# Patient Record
Sex: FEMALE | Employment: OTHER | URBAN - METROPOLITAN AREA
[De-identification: names, ages, dates, MRNs, and addresses within clinical notes are randomized per-mention and may not be internally consistent; named-entity substitution may affect disease eponyms.]

---

## 2023-04-03 DIAGNOSIS — Z00.00 HEALTH CARE MAINTENANCE: Primary | ICD-10-CM

## 2023-04-04 RX ORDER — MULTIVITAMIN WITH FOLIC ACID 400 MCG
TABLET ORAL
Qty: 30 EACH | Refills: 11 | Status: SHIPPED | OUTPATIENT
Start: 2023-04-04 | End: 2023-05-16

## 2023-05-02 DIAGNOSIS — F25.9 SCHIZOAFFECTIVE DISORDER, UNSPECIFIED TYPE (MULTI): Primary | ICD-10-CM

## 2023-05-04 RX ORDER — RISPERIDONE 1 MG/1
TABLET ORAL
Qty: 62 TABLET | Refills: 0 | Status: SHIPPED | OUTPATIENT
Start: 2023-05-04 | End: 2023-05-16

## 2023-05-15 ENCOUNTER — OFFICE VISIT (OUTPATIENT)
Dept: PRIMARY CARE | Facility: CLINIC | Age: 43
End: 2023-05-15
Payer: MEDICAID

## 2023-05-15 VITALS — HEART RATE: 77 BPM | SYSTOLIC BLOOD PRESSURE: 118 MMHG | OXYGEN SATURATION: 99 % | DIASTOLIC BLOOD PRESSURE: 72 MMHG

## 2023-05-15 DIAGNOSIS — E55.9 VITAMIN D DEFICIENCY: Primary | ICD-10-CM

## 2023-05-15 DIAGNOSIS — G40.909 SEIZURE DISORDER (MULTI): ICD-10-CM

## 2023-05-15 DIAGNOSIS — Z86.2 HISTORY OF ANEMIA: ICD-10-CM

## 2023-05-15 DIAGNOSIS — F25.9 SCHIZOAFFECTIVE DISORDER, UNSPECIFIED TYPE (MULTI): ICD-10-CM

## 2023-05-15 PROBLEM — R92.30 DENSE BREAST TISSUE ON MAMMOGRAM: Status: ACTIVE | Noted: 2023-05-15

## 2023-05-15 PROCEDURE — 1036F TOBACCO NON-USER: CPT | Performed by: SPECIALIST

## 2023-05-15 PROCEDURE — 99214 OFFICE O/P EST MOD 30 MIN: CPT | Performed by: SPECIALIST

## 2023-05-15 RX ORDER — ERGOCALCIFEROL 1.25 MG/1
1.25 CAPSULE ORAL
COMMUNITY
End: 2023-05-16

## 2023-05-15 RX ORDER — PHENOBARBITAL 64.8 MG/1
64.8 TABLET ORAL 2 TIMES DAILY
COMMUNITY
Start: 2023-03-01 | End: 2023-12-13

## 2023-05-15 RX ORDER — ACETAMINOPHEN 325 MG/1
325 TABLET ORAL EVERY 6 HOURS PRN
COMMUNITY
Start: 2019-10-04

## 2023-05-15 RX ORDER — DOCUSATE SODIUM 100 MG/1
100 CAPSULE, LIQUID FILLED ORAL DAILY
COMMUNITY
Start: 2021-07-29 | End: 2023-05-16

## 2023-05-15 RX ORDER — FERROUS SULFATE 325(65) MG
65 TABLET ORAL EVERY OTHER DAY
COMMUNITY
Start: 2020-10-14 | End: 2023-05-16

## 2023-05-15 RX ORDER — FERROUS SULFATE, DRIED 160(50) MG
1 TABLET, EXTENDED RELEASE ORAL 2 TIMES DAILY
COMMUNITY
Start: 2020-08-07 | End: 2023-05-16

## 2023-05-15 RX ORDER — ALUMINUM HYDROXIDE, MAGNESIUM HYDROXIDE, AND SIMETHICONE 1200; 120; 1200 MG/30ML; MG/30ML; MG/30ML
15 SUSPENSION ORAL EVERY 6 HOURS PRN
COMMUNITY
End: 2023-05-16

## 2023-05-15 RX ORDER — ZONISAMIDE 100 MG/1
100 CAPSULE ORAL DAILY
COMMUNITY

## 2023-05-15 RX ORDER — FLUTICASONE PROPIONATE 50 MCG
1 SPRAY, SUSPENSION (ML) NASAL
COMMUNITY
End: 2023-05-16

## 2023-05-15 RX ORDER — CODEINE PHOSPHATE AND GUAIFENESIN 10; 100 MG/5ML; MG/5ML
10 SOLUTION ORAL EVERY 6 HOURS PRN
COMMUNITY
End: 2023-05-18 | Stop reason: ALTCHOICE

## 2023-05-15 RX ORDER — PHENYTOIN SODIUM 100 MG/1
100 CAPSULE, EXTENDED RELEASE ORAL DAILY
COMMUNITY
Start: 2017-12-01

## 2023-05-15 RX ORDER — ESCITALOPRAM OXALATE 20 MG/1
20 TABLET ORAL DAILY
COMMUNITY
End: 2023-05-16

## 2023-05-15 RX ORDER — ESCITALOPRAM OXALATE 20 MG/1
20 TABLET ORAL
COMMUNITY
Start: 2020-08-06 | End: 2023-05-15 | Stop reason: ENTERED-IN-ERROR

## 2023-05-15 NOTE — PROGRESS NOTES
Subjective   Patient ID: Cleo Lopez is a 42 y.o. female who presents for Follow-up.  HPI      41 yo female Pmhx sig for Seizure Disorder, Allergic Rhinitis, Moderate Mental Retardation presents today with caregiver for follow-up    Due for wellness exam in November    No Known Allergies   Current Outpatient Medications   Medication Instructions    acetaminophen (TYLENOL) 325 mg, oral, Every 6 hours PRN    Antacid-Antigas 200-200-20 mg/5 mL oral suspension TAKE 30 ML BY MOUTH EVERY 4 HOURS AS NEEDED FOR INDIGESTION (MAY USE UP TO 3 TIMES PER WEEK)    codeine-guaifenesin (Robitussin-AC)  mg/5 mL syrup 10 mL, oral, Every 6 hours PRN    docusate sodium (Colace) 100 mg capsule TAKE 1 CAPSULE BY MOUTH ONCE EVERY DAY FOR CONSTIPATION (STOOL SOFTENER) (8PM)    escitalopram (Lexapro) 20 mg tablet TAKE 1 TABLET BY MOUTH ONCE EVERY DAY FOR DEPRESSION    FeroSuL 325 mg (65 mg iron) tablet TAKE 1 TABLET BY MOUTH TWICE WEEKLY ON WEDNESDAY & SATURDAY FOR SUPPLEMENT    fluticasone (Flonase) 50 mcg/actuation nasal spray USE 1 SPRAY IN EACH NOSTRIL 2 TIMES DAILY AS NEEDED FOR ALLERGY SYMPTOMS    multivitamin with folic acid (Tab-A-Shravan) 400 mcg tablet TAKE 1 TABLET BY MOUTH EVERY EVENING FOR SUPPLEMENT    Oyster Shell Calcium-Vit D3 500 mg-5 mcg (200 unit) tablet TAKE 1 TABLET BY MOUTH TWICE DAILY FOR SUPPLEMENT (7AM,8PM)    PHENobarbitaL (LUMINAL) 64.8 mg, oral, 2 times daily    phenytoin ER (DILANTIN) 60 mg, oral, Daily    phenytoin ER (DILANTIN) 100 mg, oral, Daily    risperiDONE (RisperDAL) 1 mg tablet TAKE 1 TABLET BY MOUTH TWICE DAILY FOR ANTIPSYCHOTIC (7AM,8PM)    Vitamin D2 1,250 mcg (50,000 unit) capsule TAKE 1 CAPSULE BY MOUTH EVERY OTHER WEEK (14TH & 28TH OF EACH MONTH) FOR BONE HEALTH    zonisamide (ZONEGRAN) 100 mg, oral, Daily, 3 capsules by mouth at bedtime.        Review of Systems  Constitutional  No fatigue, no fevers, no chills, no unintentional weight loss,   HEENT:  No headaches, no dizziness,    Cardiovascular:  No chest pain, no palpitations,  Respiratory:  No cough, No shortness of breath at rest  GI:  No abdominal pain, no nausea, no vomiting, no changes in bowel habits, no bright red blood per rectum, no melena  :  No urinary frequency, no dysuria, no urine incontinence  MSK:  One fall    Neuro:  Last seizure 3 months ago, no tremors, no extremity weakness,     Physical Exam  /72   Pulse 77   SpO2 99%   General:    Well-appearing  F in no acute distress, well nourished, well hydrated  Head:  Normocephalic, atraumatic  Skin:          Warm dry,   Eyes:  Anicteric sclera, pupils equal,   Oral:       Not examined due to pandemic  Neck:   Supple, no cervical/supraclavicular adenopathy, no thyromegaly or nodules appreciated on exam  Cor:      Regular rate, normal S1, S2, no murmurs appreciated, no S3, no S4   Lungs:   Clear to auscultation b/l, no wheezes, no rhonchi, no crackles, no accessory respiratory muscle use  Abd                  soft nontender    Assessment/Plan   Problem List Items Addressed This Visit          Nervous    Seizure disorder (CMS/HCC)     Management per neurology   Ordered phenytoin level         Relevant Orders    Phenytoin level, free    Comprehensive Metabolic Panel    CBC       Endocrine/Metabolic    Vitamin D deficiency - Primary     On weekly 50,000 units Vitamin D every other week, labs ordered Vit D         Relevant Orders    Vitamin D 25-Hydroxy,Total       Other    History of anemia     Iron levels ordered         Relevant Orders    Iron and TIBC    CBC          Carmelita Stiles DO

## 2023-05-16 DIAGNOSIS — Z00.00 HEALTH CARE MAINTENANCE: ICD-10-CM

## 2023-05-16 DIAGNOSIS — F25.9 SCHIZOAFFECTIVE DISORDER, UNSPECIFIED TYPE (MULTI): ICD-10-CM

## 2023-05-16 RX ORDER — DOCUSATE SODIUM 100 MG/1
CAPSULE, LIQUID FILLED ORAL
Qty: 31 CAPSULE | Refills: 5 | Status: SHIPPED | OUTPATIENT
Start: 2023-05-16 | End: 2023-10-28

## 2023-05-16 RX ORDER — LANOLIN ALCOHOL/MO/W.PET/CERES
CREAM (GRAM) TOPICAL
Qty: 62 TABLET | Refills: 5 | Status: SHIPPED | OUTPATIENT
Start: 2023-05-16 | End: 2023-10-28

## 2023-05-16 RX ORDER — ALUMINUM HYDROXIDE, MAGNESIUM HYDROXIDE, SIMETHICONE 400; 400; 40 MG/10ML; MG/10ML; MG/10ML
SUSPENSION ORAL
Qty: 355 ML | Refills: 5 | Status: SHIPPED | OUTPATIENT
Start: 2023-05-16

## 2023-05-16 RX ORDER — MULTIVITAMIN WITH FOLIC ACID 400 MCG
TABLET ORAL
Qty: 30 EACH | Refills: 11 | Status: SHIPPED | OUTPATIENT
Start: 2023-05-16 | End: 2024-05-08

## 2023-05-16 RX ORDER — FERROUS SULFATE TAB 325 MG (65 MG ELEMENTAL FE) 325 (65 FE) MG
TAB ORAL
Qty: 10 TABLET | Refills: 5 | Status: SHIPPED | OUTPATIENT
Start: 2023-05-16 | End: 2023-12-22 | Stop reason: SDUPTHER

## 2023-05-16 RX ORDER — ERGOCALCIFEROL 1.25 1/1
CAPSULE ORAL
Qty: 2 CAPSULE | Refills: 5 | Status: SHIPPED | OUTPATIENT
Start: 2023-05-16 | End: 2023-11-27

## 2023-05-16 RX ORDER — RISPERIDONE 1 MG/1
TABLET ORAL
Qty: 62 TABLET | Refills: 5 | Status: SHIPPED | OUTPATIENT
Start: 2023-05-16 | End: 2023-10-28

## 2023-05-16 RX ORDER — ESCITALOPRAM OXALATE 20 MG/1
TABLET ORAL
Qty: 31 TABLET | Refills: 5 | Status: SHIPPED | OUTPATIENT
Start: 2023-05-16 | End: 2023-10-28

## 2023-05-16 RX ORDER — FLUTICASONE PROPIONATE 50 MCG
SPRAY, SUSPENSION (ML) NASAL
Qty: 16 G | Refills: 5 | Status: SHIPPED | OUTPATIENT
Start: 2023-05-16 | End: 2023-12-13 | Stop reason: WASHOUT

## 2023-05-18 NOTE — PROGRESS NOTES
Subjective   Patient ID: Cleo Lopez is a 42 y.o. female who presents for Follow-up.  HPI      41 yo female Pmhx sig for Seizure Disorder, Allergic Rhinitis, Moderate Mental Retardation presents today with caregiver for follow-up    Due for wellness exam in November    No Known Allergies   Current Outpatient Medications   Medication Instructions    acetaminophen (TYLENOL) 325 mg, oral, Every 6 hours PRN    Antacid-Antigas 200-200-20 mg/5 mL oral suspension TAKE 30 ML BY MOUTH EVERY 4 HOURS AS NEEDED FOR INDIGESTION (MAY USE UP TO 3 TIMES PER WEEK)    docusate sodium (Colace) 100 mg capsule TAKE 1 CAPSULE BY MOUTH ONCE EVERY DAY FOR CONSTIPATION (STOOL SOFTENER) (8PM)    escitalopram (Lexapro) 20 mg tablet TAKE 1 TABLET BY MOUTH ONCE EVERY DAY FOR DEPRESSION    FeroSuL 325 mg (65 mg iron) tablet TAKE 1 TABLET BY MOUTH TWICE WEEKLY ON WEDNESDAY & SATURDAY FOR SUPPLEMENT    fluticasone (Flonase) 50 mcg/actuation nasal spray USE 1 SPRAY IN EACH NOSTRIL 2 TIMES DAILY AS NEEDED FOR ALLERGY SYMPTOMS    multivitamin with folic acid (Tab-A-Shravan) 400 mcg tablet TAKE 1 TABLET BY MOUTH EVERY EVENING FOR SUPPLEMENT    Oyster Shell Calcium-Vit D3 500 mg-5 mcg (200 unit) tablet TAKE 1 TABLET BY MOUTH TWICE DAILY FOR SUPPLEMENT (7AM,8PM)    PHENobarbitaL (LUMINAL) 64.8 mg, oral, 2 times daily    phenytoin ER (DILANTIN) 60 mg, oral, Daily    phenytoin ER (DILANTIN) 100 mg, oral, Daily    risperiDONE (RisperDAL) 1 mg tablet TAKE 1 TABLET BY MOUTH TWICE DAILY FOR ANTIPSYCHOTIC (7AM,8PM)    Vitamin D2 1,250 mcg (50,000 unit) capsule TAKE 1 CAPSULE BY MOUTH EVERY OTHER WEEK (14TH & 28TH OF EACH MONTH) FOR BONE HEALTH    zonisamide (ZONEGRAN) 100 mg, oral, Daily, 3 capsules by mouth at bedtime.        Review of Systems  Constitutional  No fatigue, no fevers, no chills, no unintentional weight loss,   HEENT:  No headaches, no dizziness,   Cardiovascular:  No chest pain, no palpitations,  Respiratory:  No cough, No shortness of breath  at rest  GI:  No abdominal pain, no nausea, no vomiting, no changes in bowel habits, no bright red blood per rectum, no melena  :  No urinary frequency, no dysuria, no urine incontinence  MSK:  One fall    Neuro:  Last seizure 3 months ago, no tremors, no extremity weakness,     Physical Exam  /72   Pulse 77   SpO2 99%   General:    Well-appearing  F in no acute distress, well nourished, well hydrated  Head:  Normocephalic, atraumatic  Skin:          Warm dry,   Eyes:  Anicteric sclera, pupils equal,   Oral:       Not examined due to pandemic  Neck:   Supple, no cervical/supraclavicular adenopathy, no thyromegaly or nodules appreciated on exam  Cor:      Regular rate, normal S1, S2, no murmurs appreciated, no S3, no S4   Lungs:   Clear to auscultation b/l, no wheezes, no rhonchi, no crackles, no accessory respiratory muscle use  Abd                  soft nontender    Assessment/Plan   Problem List Items Addressed This Visit          Nervous    Seizure disorder (CMS/HCC)     Management per neurology   Ordered phenytoin level         Relevant Orders    Phenytoin level, free    Comprehensive Metabolic Panel    CBC       Endocrine/Metabolic    Vitamin D deficiency - Primary     On weekly 50,000 units Vitamin D every other week, labs ordered Vit D         Relevant Orders    Vitamin D 25-Hydroxy,Total       Other    Schizoaffective disorder (CMS/HCC)     Stable on current medication         History of anemia     Iron levels ordered  Taking iron supplement 2 days per week         Relevant Orders    Iron and TIBC    CBC          Carmelita Stiles DO

## 2023-10-27 DIAGNOSIS — Z00.00 HEALTH CARE MAINTENANCE: ICD-10-CM

## 2023-10-27 DIAGNOSIS — F25.9 SCHIZOAFFECTIVE DISORDER, UNSPECIFIED TYPE (MULTI): ICD-10-CM

## 2023-10-28 RX ORDER — ESCITALOPRAM OXALATE 20 MG/1
TABLET ORAL
Qty: 30 TABLET | Refills: 2 | Status: SHIPPED | OUTPATIENT
Start: 2023-10-28 | End: 2024-01-07

## 2023-10-28 RX ORDER — LANOLIN ALCOHOL/MO/W.PET/CERES
CREAM (GRAM) TOPICAL
Qty: 60 TABLET | Refills: 2 | Status: SHIPPED | OUTPATIENT
Start: 2023-10-28 | End: 2024-01-07

## 2023-10-28 RX ORDER — RISPERIDONE 1 MG/1
TABLET ORAL
Qty: 60 TABLET | Refills: 2 | Status: SHIPPED | OUTPATIENT
Start: 2023-10-28 | End: 2024-01-07

## 2023-10-28 RX ORDER — DOCUSATE SODIUM 100 MG/1
CAPSULE, LIQUID FILLED ORAL
Qty: 30 CAPSULE | Refills: 2 | Status: SHIPPED | OUTPATIENT
Start: 2023-10-28 | End: 2024-01-07

## 2023-11-24 DIAGNOSIS — Z00.00 HEALTH CARE MAINTENANCE: ICD-10-CM

## 2023-11-27 RX ORDER — ERGOCALCIFEROL 1.25 1/1
CAPSULE ORAL
Qty: 2 CAPSULE | Refills: 1 | Status: SHIPPED | OUTPATIENT
Start: 2023-11-27 | End: 2024-01-16

## 2023-12-13 ENCOUNTER — OFFICE VISIT (OUTPATIENT)
Dept: PRIMARY CARE | Facility: CLINIC | Age: 43
End: 2023-12-13
Payer: MEDICAID

## 2023-12-13 ENCOUNTER — LAB (OUTPATIENT)
Dept: LAB | Facility: LAB | Age: 43
End: 2023-12-13
Payer: MEDICAID

## 2023-12-13 VITALS
SYSTOLIC BLOOD PRESSURE: 119 MMHG | HEIGHT: 66 IN | WEIGHT: 197 LBS | HEART RATE: 77 BPM | RESPIRATION RATE: 16 BRPM | OXYGEN SATURATION: 98 % | BODY MASS INDEX: 31.66 KG/M2 | DIASTOLIC BLOOD PRESSURE: 77 MMHG

## 2023-12-13 DIAGNOSIS — Z00.00 MEDICARE ANNUAL WELLNESS VISIT, SUBSEQUENT: Primary | ICD-10-CM

## 2023-12-13 DIAGNOSIS — Z12.31 ENCOUNTER FOR SCREENING MAMMOGRAM FOR MALIGNANT NEOPLASM OF BREAST: ICD-10-CM

## 2023-12-13 DIAGNOSIS — G40.909 SEIZURE DISORDER (MULTI): ICD-10-CM

## 2023-12-13 DIAGNOSIS — Z23 NEED FOR IMMUNIZATION AGAINST INFLUENZA: ICD-10-CM

## 2023-12-13 DIAGNOSIS — Z12.11 SCREEN FOR COLON CANCER: ICD-10-CM

## 2023-12-13 DIAGNOSIS — E55.9 VITAMIN D DEFICIENCY: ICD-10-CM

## 2023-12-13 DIAGNOSIS — E78.5 HYPERLIPIDEMIA, UNSPECIFIED HYPERLIPIDEMIA TYPE: ICD-10-CM

## 2023-12-13 DIAGNOSIS — Z86.2 HISTORY OF ANEMIA: ICD-10-CM

## 2023-12-13 LAB
25(OH)D3 SERPL-MCNC: 31 NG/ML (ref 30–100)
ALBUMIN SERPL BCP-MCNC: 4 G/DL (ref 3.4–5)
ALP SERPL-CCNC: 145 U/L (ref 33–110)
ALT SERPL W P-5'-P-CCNC: 18 U/L (ref 7–45)
ANION GAP SERPL CALC-SCNC: 13 MMOL/L (ref 10–20)
AST SERPL W P-5'-P-CCNC: 17 U/L (ref 9–39)
BILIRUB SERPL-MCNC: 0.3 MG/DL (ref 0–1.2)
BUN SERPL-MCNC: 17 MG/DL (ref 6–23)
CALCIUM SERPL-MCNC: 8.9 MG/DL (ref 8.6–10.6)
CHLORIDE SERPL-SCNC: 108 MMOL/L (ref 98–107)
CO2 SERPL-SCNC: 24 MMOL/L (ref 21–32)
CREAT SERPL-MCNC: 0.69 MG/DL (ref 0.5–1.05)
ERYTHROCYTE [DISTWIDTH] IN BLOOD BY AUTOMATED COUNT: 13.5 % (ref 11.5–14.5)
GFR SERPL CREATININE-BSD FRML MDRD: >90 ML/MIN/1.73M*2
GLUCOSE SERPL-MCNC: 83 MG/DL (ref 74–99)
HCT VFR BLD AUTO: 42.4 % (ref 36–46)
HGB BLD-MCNC: 13.9 G/DL (ref 12–16)
IRON SATN MFR SERPL: 41 % (ref 25–45)
IRON SERPL-MCNC: 92 UG/DL (ref 35–150)
MCH RBC QN AUTO: 31.2 PG (ref 26–34)
MCHC RBC AUTO-ENTMCNC: 32.8 G/DL (ref 32–36)
MCV RBC AUTO: 95 FL (ref 80–100)
NRBC BLD-RTO: 0 /100 WBCS (ref 0–0)
PLATELET # BLD AUTO: 239 X10*3/UL (ref 150–450)
POTASSIUM SERPL-SCNC: 4.2 MMOL/L (ref 3.5–5.3)
PROT SERPL-MCNC: 7.1 G/DL (ref 6.4–8.2)
RBC # BLD AUTO: 4.46 X10*6/UL (ref 4–5.2)
SODIUM SERPL-SCNC: 141 MMOL/L (ref 136–145)
TIBC SERPL-MCNC: 225 UG/DL (ref 240–445)
UIBC SERPL-MCNC: 133 UG/DL (ref 110–370)
WBC # BLD AUTO: 7.7 X10*3/UL (ref 4.4–11.3)

## 2023-12-13 PROCEDURE — G0439 PPPS, SUBSEQ VISIT: HCPCS | Performed by: SPECIALIST

## 2023-12-13 PROCEDURE — 83550 IRON BINDING TEST: CPT

## 2023-12-13 PROCEDURE — 80186 ASSAY OF PHENYTOIN FREE: CPT

## 2023-12-13 PROCEDURE — 82306 VITAMIN D 25 HYDROXY: CPT

## 2023-12-13 PROCEDURE — 36415 COLL VENOUS BLD VENIPUNCTURE: CPT

## 2023-12-13 PROCEDURE — 83540 ASSAY OF IRON: CPT

## 2023-12-13 PROCEDURE — 85027 COMPLETE CBC AUTOMATED: CPT

## 2023-12-13 PROCEDURE — 80053 COMPREHEN METABOLIC PANEL: CPT

## 2023-12-13 PROCEDURE — 90471 IMMUNIZATION ADMIN: CPT | Performed by: SPECIALIST

## 2023-12-13 PROCEDURE — 90686 IIV4 VACC NO PRSV 0.5 ML IM: CPT | Performed by: SPECIALIST

## 2023-12-13 RX ORDER — CHLORHEXIDINE GLUCONATE ORAL RINSE 1.2 MG/ML
SOLUTION DENTAL 2 TIMES DAILY
COMMUNITY
Start: 2023-11-20

## 2023-12-13 RX ORDER — DIAZEPAM 10 MG/100UL
1 SPRAY NASAL ONCE
COMMUNITY

## 2023-12-13 ASSESSMENT — PATIENT HEALTH QUESTIONNAIRE - PHQ9
SUM OF ALL RESPONSES TO PHQ9 QUESTIONS 1 AND 2: 0
1. LITTLE INTEREST OR PLEASURE IN DOING THINGS: NOT AT ALL
1. LITTLE INTEREST OR PLEASURE IN DOING THINGS: NOT AT ALL
2. FEELING DOWN, DEPRESSED OR HOPELESS: NOT AT ALL
2. FEELING DOWN, DEPRESSED OR HOPELESS: NOT AT ALL
SUM OF ALL RESPONSES TO PHQ9 QUESTIONS 1 AND 2: 0

## 2023-12-13 ASSESSMENT — ACTIVITIES OF DAILY LIVING (ADL)
GROCERY_SHOPPING: TOTAL CARE
TAKING_MEDICATION: TOTAL CARE
MANAGING_FINANCES: TOTAL CARE
BATHING: INDEPENDENT
DOING_HOUSEWORK: TOTAL CARE
DRESSING: INDEPENDENT

## 2023-12-13 ASSESSMENT — ENCOUNTER SYMPTOMS
LOSS OF SENSATION IN FEET: 0
DEPRESSION: 0
OCCASIONAL FEELINGS OF UNSTEADINESS: 0

## 2023-12-13 NOTE — PATIENT INSTRUCTIONS
Received annual influenza vaccine today  Recommend Covid vaccine  Recommend Tdap every 10 yrs  Schedule mammogram, colonoscopy and follow-up gynecology  Get blood work done (open orders from 5/2023)

## 2023-12-13 NOTE — PROGRESS NOTES
Subjective   Patient ID: Cleo Lopez is a 43 y.o. female who presents for No chief complaint on file..  HPI    42 yo female Pmhx sig for Seizure disorder, Allergic Rhinitis and Moderate MR presents for MAW with caregiver  Info for this visit mainly from caregiver due to patient's MR    Dilantin 30 mg tab ER 4 capsules 120 nmg HS for seizures at 8 pm  100 mg HS dilantin ER and 30 4 capsules 120 HS at bedtime with 100 mg   This is refilled by neurology, unclear if she's ER dilantin or not  Lost weight was up to 204 on portion control      Sees neurologist for seizure meds takes dilantin, phenobarb    No Known Allergies   Current Outpatient Medications   Medication Instructions    acetaminophen (TYLENOL) 325 mg, oral, Every 6 hours PRN    Antacid-Antigas 200-200-20 mg/5 mL oral suspension TAKE 30 ML BY MOUTH EVERY 4 HOURS AS NEEDED FOR INDIGESTION (MAY USE UP TO 3 TIMES PER WEEK)    chlorhexidine (Peridex) 0.12 % solution Use in the mouth or throat 2 times a day. Swish and spit    diazePAM (Valtoco) 20 mg/2 spray spray,non-aerosol nasal spray 1 spray, Each Nostril, Once    docusate sodium (Colace) 100 mg capsule TAKE 1 CAPSULE BY MOUTH ONCE EVERY DAY FOR CONSTIPATION (STOOL SOFTENER) (8PM)    escitalopram (Lexapro) 20 mg tablet TAKE 1 TABLET BY MOUTH ONCE EVERY DAY FOR DEPRESSION    ferrous sulfate, 325 mg ferrous sulfate, (FeroSuL) tablet TAKE 1 TABLET BY MOUTH TWICE WEEKLY ON WEDNESDAY & SATURDAY FOR SUPPLEMENT    multivitamin with folic acid (Tab-A-Shravan) 400 mcg tablet TAKE 1 TABLET BY MOUTH EVERY EVENING FOR SUPPLEMENT    Oyster Shell Calcium-Vit D3 500 mg-5 mcg (200 unit) tablet TAKE 1 TABLET BY MOUTH TWICE DAILY FOR SUPPLEMENT (7AM,8PM)    PHENobarbitaL 64.8 mg, oral, 2 times daily    phenytoin ER (DILANTIN) 120 mg, oral, Daily    phenytoin ER (DILANTIN) 100 mg, oral, Daily    risperiDONE (RisperDAL) 1 mg tablet TAKE 1 TABLET BY MOUTH TWICE DAILY FOR ANTIPSYCHOTIC (7AM,8PM)    Vitamin D2 1,250 mcg (50,000  "unit) capsule (NOT CF) TAKE 1 CAPSULE BY MOUTH EVERY OTHER WEEK (14TH & 28TH OF EACH MONTH) FOR BONE HEALTH    zonisamide (ZONEGRAN) 100 mg, oral, Daily, 3 capsules by mouth at bedtime.        Review of Systems (Caregiver providing most information)  Constitutional  No fatigue, no fevers, no chills, no unintentional weight loss,   HEENT:  No headaches, no dizziness, no double vision, no blurred vision, no hearing loss  Cardiovascular:  No chest pain, no palpitations, no shortness of breath with exertion (one flight of stairs),   Respiratory:  No cough, no hemoptysis, no wheezing, No shortness of breath at rest  GI:  No dysphagia, no odynophagia, no reflux, no abdominal pain, no nausea, no vomiting, no changes in bowel habits, no bright red blood per rectum, no melena  :  No urinary frequency, no dysuria, no urine incontinence  MSK:  Occasional falls, no joint pain, no joint swelling  Neuro:  No tremors, no extremity weakness, no changes in sensation, no recent seizures    Physical Exam  /77   Pulse 77   Resp 16   Ht 1.676 m (5' 6\")   Wt 89.4 kg (197 lb)   SpO2 98%   BMI 31.80 kg/m²   General:    Well-appearing  F in no acute distress, well nourished, well hydrated  Head:  Normocephalic, atraumatic  Skin:          Warm dry,   Eyes:  Anicteric sclera, pupils equal,   Ears:        TMs intact  Oral:      Not examined due to pandemic  Neck:   Supple, no cervical/supraclavicular adenopathy, no thyromegaly or nodules appreciated on exam  Cor:      Regular rate, normal S1, S2, no murmurs appreciated, no S3, no S4   Lungs:   Clear to auscultation b/l, no wheezes, no rhonchi, no crackles, no accessory respiratory muscle use  Abd:          Soft, nontender, no guarding, no rebound, no hepatosplenomegaly appreciated   Ext:            No lower extremity edema, no palpable cords  Pulses:      Pedal pulses intact  Neuro:   CN2-12 grossly intact   Breasts:     No Axillary adenopathy, no discrete palpable breast " nodules, no overlying skin changes, no nipple discharge    Assessment/Plan   Problem List Items Addressed This Visit       Seizure disorder (CMS/HCC)     Management per neurology           Medicare annual wellness visit, subsequent - Primary     Recommend annual influenza vaccine, administered today  Recommend Covid vaccine  Prior Td 8/2022, recommend repeat in 10 yrs  Mammogram 12/17/2021, ordered  Recommend annual gynecology exam, last pap 8/2020, caregiver to schedule  Colonoscopy ordered, has been taking iron, labs ordered             Hyperlipidemia      on 12/2022  Opted to recheck next visit since not fasting today           Other Visit Diagnoses       Screen for colon cancer        Relevant Orders    Colonoscopy Screening; Average Risk Patient    Encounter for screening mammogram for malignant neoplasm of breast        Relevant Orders    BI mammo bilateral screening tomosynthesis    Need for immunization against influenza        Relevant Orders    Flu vaccine (IIV4) age 6 months and greater, preservative free (Completed)             Carmelita Stiles DO

## 2023-12-15 LAB
PHENYTOIN FREE SERPL-MCNC: 2.7 UG/ML (ref 1–2)
SCAN RESULT: ABNORMAL

## 2023-12-22 DIAGNOSIS — Z00.00 HEALTH CARE MAINTENANCE: ICD-10-CM

## 2023-12-23 RX ORDER — FERROUS SULFATE 325(65) MG
TABLET ORAL
Qty: 10 TABLET | Refills: 6 | Status: SHIPPED | OUTPATIENT
Start: 2023-12-23

## 2023-12-24 DIAGNOSIS — R74.8 ELEVATED ALKALINE PHOSPHATASE LEVEL: Primary | ICD-10-CM

## 2023-12-24 DIAGNOSIS — E78.5 HYPERLIPIDEMIA, UNSPECIFIED HYPERLIPIDEMIA TYPE: ICD-10-CM

## 2023-12-24 NOTE — ASSESSMENT & PLAN NOTE
Recommend annual influenza vaccine, administered today  Recommend Covid vaccine  Prior Td 8/2022, recommend repeat in 10 yrs  Mammogram 12/17/2021, ordered  Recommend annual gynecology exam, last pap 8/2020, caregiver to schedule  Colonoscopy ordered, has been taking iron, labs ordered

## 2024-01-05 DIAGNOSIS — Z00.00 HEALTH CARE MAINTENANCE: ICD-10-CM

## 2024-01-05 DIAGNOSIS — F25.9 SCHIZOAFFECTIVE DISORDER, UNSPECIFIED TYPE (MULTI): ICD-10-CM

## 2024-01-07 RX ORDER — ESCITALOPRAM OXALATE 20 MG/1
TABLET ORAL
Qty: 31 TABLET | Refills: 5 | Status: SHIPPED | OUTPATIENT
Start: 2024-01-07

## 2024-01-07 RX ORDER — LANOLIN ALCOHOL/MO/W.PET/CERES
CREAM (GRAM) TOPICAL
Qty: 62 TABLET | Refills: 5 | Status: SHIPPED | OUTPATIENT
Start: 2024-01-07

## 2024-01-07 RX ORDER — RISPERIDONE 1 MG/1
TABLET ORAL
Qty: 62 TABLET | Refills: 5 | Status: SHIPPED | OUTPATIENT
Start: 2024-01-07

## 2024-01-07 RX ORDER — DOCUSATE SODIUM 100 MG/1
CAPSULE, LIQUID FILLED ORAL
Qty: 31 CAPSULE | Refills: 5 | Status: SHIPPED | OUTPATIENT
Start: 2024-01-07

## 2024-01-15 DIAGNOSIS — Z00.00 HEALTH CARE MAINTENANCE: ICD-10-CM

## 2024-01-16 RX ORDER — ERGOCALCIFEROL 1.25 1/1
CAPSULE ORAL
Qty: 2 CAPSULE | Refills: 5 | Status: SHIPPED | OUTPATIENT
Start: 2024-01-16

## 2024-05-07 DIAGNOSIS — Z00.00 HEALTH CARE MAINTENANCE: ICD-10-CM

## 2024-05-08 RX ORDER — MULTIVITAMIN WITH FOLIC ACID 400 MCG
TABLET ORAL
Qty: 31 TABLET | Refills: 3 | Status: SHIPPED | OUTPATIENT
Start: 2024-05-08

## 2024-06-17 DIAGNOSIS — Z00.00 HEALTH CARE MAINTENANCE: ICD-10-CM

## 2024-06-17 RX ORDER — ERGOCALCIFEROL 1.25 MG/1
CAPSULE ORAL
Qty: 2 CAPSULE | Refills: 5 | Status: SHIPPED | OUTPATIENT
Start: 2024-06-17

## 2024-06-19 ENCOUNTER — APPOINTMENT (OUTPATIENT)
Dept: PRIMARY CARE | Facility: CLINIC | Age: 44
End: 2024-06-19
Payer: MEDICAID

## 2024-06-19 VITALS
OXYGEN SATURATION: 97 % | DIASTOLIC BLOOD PRESSURE: 55 MMHG | HEART RATE: 78 BPM | BODY MASS INDEX: 31.28 KG/M2 | SYSTOLIC BLOOD PRESSURE: 108 MMHG | WEIGHT: 193.8 LBS

## 2024-06-19 DIAGNOSIS — E22.1 HYPERPROLACTINEMIA (MULTI): ICD-10-CM

## 2024-06-19 DIAGNOSIS — F25.9 SCHIZOAFFECTIVE DISORDER, UNSPECIFIED TYPE (MULTI): ICD-10-CM

## 2024-06-19 DIAGNOSIS — Z86.2 HISTORY OF ANEMIA: ICD-10-CM

## 2024-06-19 DIAGNOSIS — G40.909 SEIZURE DISORDER (MULTI): ICD-10-CM

## 2024-06-19 DIAGNOSIS — E78.5 HYPERLIPIDEMIA, UNSPECIFIED HYPERLIPIDEMIA TYPE: Primary | ICD-10-CM

## 2024-06-19 DIAGNOSIS — R39.9 UTI SYMPTOMS: ICD-10-CM

## 2024-06-19 PROCEDURE — 99213 OFFICE O/P EST LOW 20 MIN: CPT | Performed by: SPECIALIST

## 2024-06-19 RX ORDER — GUAIFENESIN/DEXTROMETHORPHAN 100-10MG/5
5 SYRUP ORAL 4 TIMES DAILY PRN
COMMUNITY

## 2024-06-19 NOTE — PROGRESS NOTES
Subjective   Patient ID: Cleo Lopez is a 43 y.o. female who presents for Follow-up.  HPI    44 yo female Pmhx sig for Seizure disorder, Allergic Rhinitis and Moderate MR presents for f/up accompanied by caregiver    Info for this visit mainly from caregiver due to patient's MR    No Known Allergies   Current Outpatient Medications   Medication Instructions    acetaminophen (TYLENOL) 325 mg, oral, Every 6 hours PRN    chlorhexidine (Peridex) 0.12 % solution Use in the mouth or throat 2 times a day. Swish and spit    dextromethorphan-guaifenesin (Robitussin DM)  mg/5 mL oral liquid 5 mL, oral, 4 times daily PRN    diazePAM (Valtoco) 20 mg/2 spray spray,non-aerosol nasal spray 1 spray, Each Nostril, Once    docusate sodium (Colace) 100 mg capsule TAKE 1 CAPSULE BY MOUTH ONCE EVERY DAY FOR CONSTIPATION (STOOL SOFTENER) (8PM)    ergocalciferol (Vitamin D2) 1.25 MG (23523 UT) capsule (NOT CF) TAKE 1 CAPSULE BY MOUTH EVERY OTHER WEEK (14TH & 28TH OF EACH MONTH) FOR BONE HEALTH    escitalopram (Lexapro) 20 mg tablet TAKE 1 TABLET BY MOUTH ONCE EVERY DAY FOR DEPRESSION    ferrous sulfate, 325 mg ferrous sulfate, (FeroSuL) tablet TAKE 1 TABLET BY MOUTH TWICE WEEKLY ON WEDNESDAY & SATURDAY FOR SUPPLEMENT    Oyster Shell Calcium-Vit D3 500 mg-5 mcg (200 unit) tablet TAKE 1 TABLET BY MOUTH TWICE DAILY FOR SUPPLEMENT (7AM,8PM)    PHENobarbitaL 64.8 mg, oral, 2 times daily    phenytoin ER (DILANTIN) 120 mg, oral, Daily    phenytoin ER (DILANTIN) 100 mg, oral, Daily    risperiDONE (RisperDAL) 1 mg tablet TAKE 1 TABLET BY MOUTH TWICE DAILY FOR ANTIPSYCHOTIC (7AM,8PM)    Tab-A-Shravan 400 mcg tablet TAKE 1 TABLET BY MOUTH EVERY EVENING FOR SUPPLEMENT    zonisamide (ZONEGRAN) 100 mg, oral, Daily, 3 capsules by mouth at bedtime.        Review of Systems  Constitutional  No fatigue, no fevers, no chills,good appetitie  HEENT:  No headaches, no dizziness,  Cardiovascular:  No chest pain, no palpitations,   Respiratory:  No cough,  No shortness of breath at rest  GI:  No dysphagia, no odynophagia, no reflux, no abdominal pain, no nausea, no vomiting, bm every 2-3 days no changes in bowel habits, no bright red blood per rectum, no melena  :   Positive Urinary frequency, some urgency no dysuria, no urine incontinence  MSK:  Always falls, no joint pain,   Neuro:  No tremors, no extremity weakness, no sz    Physical Exam  /55   Pulse 78   Wt 87.9 kg (193 lb 12.8 oz)   SpO2 97%   BMI 31.28 kg/m²   General:    Well-appearing  F in no acute distress, well nourished, well hydrated  Head:  Normocephalic, atraumatic  Skin:          Warm dry,   Eyes:  Anicteric sclera, pupils equal,   Oral:      Not examined due to pandemic  Neck:   Supple,   Cor:      Regular rate, normal S1, S2, no murmurs appreciated, no S3, no S4   Lungs:   Clear to auscultation b/l, no wheezes, no rhonchi, no crackles, no accessory respiratory muscle use  Abd:                 No CVA ttp    Assessment/Plan   Problem List Items Addressed This Visit       Seizure disorder (Multi)     Management per neurology  Seizure free per caregiver           Relevant Orders    Comprehensive Metabolic Panel (Completed)    Schizoaffective disorder (Multi)     Management per psychiatry  On Risperidone and Lexapro         History of anemia     Takes Fe supplement 2 days per week  Labs ordered CBC  Plan to recheck iron levels at Annual exam (Fe 92 in December 2023)           Relevant Orders    CBC (Completed)    Hyperlipidemia - Primary      in 2022  Had previously ordered fx lipids but not done  Labs ordered          Relevant Orders    Lipid Panel (Completed)    UTI symptoms     Caregiver concerned that she may have UTI due to frequency and some urgency  Ordered labs          Relevant Orders    Urine Culture (Completed)    Microscopic Only, Urine (Completed)    Hyperprolactinemia (Multi)     Mildly elevated Prolactin level 31.9 in 1/28/2022 and 22.4 on 1/11/2022 with gyne  Was to  repeat level when fasting and after 24 hours without breast stimulation but did not do  Suspect related to Risperidone which is known to cause increased prolactin levels               Carmelita Stiles, DO

## 2024-06-20 ENCOUNTER — LAB (OUTPATIENT)
Dept: LAB | Facility: LAB | Age: 44
End: 2024-06-20
Payer: MEDICAID

## 2024-06-20 DIAGNOSIS — Z86.2 HISTORY OF ANEMIA: ICD-10-CM

## 2024-06-20 DIAGNOSIS — G40.909 SEIZURE DISORDER (MULTI): ICD-10-CM

## 2024-06-20 DIAGNOSIS — E78.5 HYPERLIPIDEMIA, UNSPECIFIED HYPERLIPIDEMIA TYPE: ICD-10-CM

## 2024-06-20 DIAGNOSIS — R39.9 UTI SYMPTOMS: ICD-10-CM

## 2024-06-20 LAB
ALBUMIN SERPL BCP-MCNC: 3.6 G/DL (ref 3.4–5)
ALP SERPL-CCNC: 166 U/L (ref 33–110)
ALT SERPL W P-5'-P-CCNC: 14 U/L (ref 7–45)
ANION GAP SERPL CALC-SCNC: 12 MMOL/L (ref 10–20)
AST SERPL W P-5'-P-CCNC: 16 U/L (ref 9–39)
BACTERIA #/AREA URNS AUTO: ABNORMAL /HPF
BILIRUB SERPL-MCNC: 0.4 MG/DL (ref 0–1.2)
BUN SERPL-MCNC: 13 MG/DL (ref 6–23)
CALCIUM SERPL-MCNC: 8.9 MG/DL (ref 8.6–10.3)
CHLORIDE SERPL-SCNC: 108 MMOL/L (ref 98–107)
CHOLEST SERPL-MCNC: 184 MG/DL (ref 0–199)
CHOLESTEROL/HDL RATIO: 3.1
CO2 SERPL-SCNC: 23 MMOL/L (ref 21–32)
CREAT SERPL-MCNC: 0.62 MG/DL (ref 0.5–1.05)
EGFRCR SERPLBLD CKD-EPI 2021: >90 ML/MIN/1.73M*2
ERYTHROCYTE [DISTWIDTH] IN BLOOD BY AUTOMATED COUNT: 12.8 % (ref 11.5–14.5)
GLUCOSE SERPL-MCNC: 79 MG/DL (ref 74–99)
HCT VFR BLD AUTO: 41.9 % (ref 36–46)
HDLC SERPL-MCNC: 59.1 MG/DL
HGB BLD-MCNC: 13.8 G/DL (ref 12–16)
LDLC SERPL CALC-MCNC: 113 MG/DL
MCH RBC QN AUTO: 30.7 PG (ref 26–34)
MCHC RBC AUTO-ENTMCNC: 32.9 G/DL (ref 32–36)
MCV RBC AUTO: 93 FL (ref 80–100)
NON HDL CHOLESTEROL: 125 MG/DL (ref 0–149)
NRBC BLD-RTO: 0 /100 WBCS (ref 0–0)
PLATELET # BLD AUTO: 214 X10*3/UL (ref 150–450)
POTASSIUM SERPL-SCNC: 4.3 MMOL/L (ref 3.5–5.3)
PROT SERPL-MCNC: 6.8 G/DL (ref 6.4–8.2)
RBC # BLD AUTO: 4.5 X10*6/UL (ref 4–5.2)
RBC #/AREA URNS AUTO: ABNORMAL /HPF
SODIUM SERPL-SCNC: 139 MMOL/L (ref 136–145)
SQUAMOUS #/AREA URNS AUTO: ABNORMAL /HPF
TRIGL SERPL-MCNC: 62 MG/DL (ref 0–149)
VLDL: 12 MG/DL (ref 0–40)
WBC # BLD AUTO: 5.2 X10*3/UL (ref 4.4–11.3)
WBC #/AREA URNS AUTO: ABNORMAL /HPF

## 2024-06-20 PROCEDURE — 85027 COMPLETE CBC AUTOMATED: CPT

## 2024-06-20 PROCEDURE — 36415 COLL VENOUS BLD VENIPUNCTURE: CPT

## 2024-06-20 PROCEDURE — 81001 URINALYSIS AUTO W/SCOPE: CPT

## 2024-06-20 PROCEDURE — 87086 URINE CULTURE/COLONY COUNT: CPT

## 2024-06-20 PROCEDURE — 82977 ASSAY OF GGT: CPT

## 2024-06-20 PROCEDURE — 80053 COMPREHEN METABOLIC PANEL: CPT

## 2024-06-20 PROCEDURE — 80061 LIPID PANEL: CPT

## 2024-06-21 LAB — BACTERIA UR CULT: NORMAL

## 2024-06-25 DIAGNOSIS — R74.8 ELEVATED ALKALINE PHOSPHATASE LEVEL: Primary | ICD-10-CM

## 2024-06-25 LAB — GGT SERPL-CCNC: 56 U/L (ref 5–55)

## 2024-07-02 PROBLEM — R39.9 UTI SYMPTOMS: Status: ACTIVE | Noted: 2024-07-02

## 2024-07-02 PROBLEM — E22.1 HYPERPROLACTINEMIA (MULTI): Status: ACTIVE | Noted: 2024-07-02

## 2024-07-02 NOTE — ASSESSMENT & PLAN NOTE
Takes Fe supplement 2 days per week  Labs ordered CBC  Plan to recheck iron levels at Annual exam (Fe 92 in December 2023)

## 2024-07-02 NOTE — ASSESSMENT & PLAN NOTE
Mildly elevated Prolactin level 31.9 in 1/28/2022 and 22.4 on 1/11/2022 with gyne  Was to repeat level when fasting and after 24 hours without breast stimulation but did not do  Suspect related to Risperidone which is known to cause increased prolactin levels  Will discuss more fully next visit

## 2024-07-31 ENCOUNTER — TELEPHONE (OUTPATIENT)
Dept: PRIMARY CARE | Facility: CLINIC | Age: 44
End: 2024-07-31

## 2024-07-31 DIAGNOSIS — F25.9 SCHIZOAFFECTIVE DISORDER, UNSPECIFIED TYPE (MULTI): ICD-10-CM

## 2024-07-31 DIAGNOSIS — Z00.00 HEALTH CARE MAINTENANCE: ICD-10-CM

## 2024-07-31 RX ORDER — DOCUSATE SODIUM 100 MG/1
100 CAPSULE, LIQUID FILLED ORAL DAILY
Qty: 31 CAPSULE | Refills: 5 | Status: SHIPPED | OUTPATIENT
Start: 2024-07-31

## 2024-07-31 RX ORDER — ESCITALOPRAM OXALATE 20 MG/1
20 TABLET ORAL DAILY
Qty: 31 TABLET | Refills: 5 | Status: SHIPPED | OUTPATIENT
Start: 2024-07-31

## 2024-07-31 RX ORDER — FERROUS SULFATE, DRIED 160(50) MG
1 TABLET, EXTENDED RELEASE ORAL 2 TIMES DAILY
Qty: 62 TABLET | Refills: 5 | Status: SHIPPED | OUTPATIENT
Start: 2024-07-31

## 2024-07-31 RX ORDER — RISPERIDONE 1 MG/1
1 TABLET ORAL 2 TIMES DAILY
Qty: 62 TABLET | Refills: 5 | Status: SHIPPED | OUTPATIENT
Start: 2024-07-31

## 2024-08-09 DIAGNOSIS — Z00.00 HEALTH CARE MAINTENANCE: ICD-10-CM

## 2024-08-10 RX ORDER — FERROUS SULFATE 325(65) MG
TABLET ORAL
Qty: 10 TABLET | Refills: 3 | Status: SHIPPED | OUTPATIENT
Start: 2024-08-10

## 2024-08-12 DIAGNOSIS — Z00.00 HEALTH CARE MAINTENANCE: ICD-10-CM

## 2024-08-12 RX ORDER — FERROUS SULFATE 325(65) MG
TABLET ORAL
Qty: 10 TABLET | Refills: 3 | Status: SHIPPED | OUTPATIENT
Start: 2024-08-12

## 2024-09-25 DIAGNOSIS — Z00.00 HEALTH CARE MAINTENANCE: ICD-10-CM

## 2024-09-26 RX ORDER — MULTIVITAMIN WITH FOLIC ACID 400 MCG
TABLET ORAL
Qty: 31 TABLET | Refills: 6 | Status: SHIPPED | OUTPATIENT
Start: 2024-09-26

## 2024-12-13 ENCOUNTER — APPOINTMENT (OUTPATIENT)
Dept: PRIMARY CARE | Facility: CLINIC | Age: 44
End: 2024-12-13
Payer: MEDICAID

## 2024-12-17 DIAGNOSIS — Z00.00 HEALTH CARE MAINTENANCE: ICD-10-CM

## 2024-12-19 RX ORDER — FERROUS SULFATE 325(65) MG
TABLET ORAL
Qty: 8 TABLET | Refills: 5 | Status: SHIPPED | OUTPATIENT
Start: 2024-12-19

## 2025-01-20 DIAGNOSIS — Z00.00 HEALTH CARE MAINTENANCE: ICD-10-CM

## 2025-01-20 DIAGNOSIS — F25.9 SCHIZOAFFECTIVE DISORDER, UNSPECIFIED TYPE: ICD-10-CM

## 2025-01-21 RX ORDER — RISPERIDONE 1 MG/1
TABLET ORAL
Qty: 62 TABLET | Refills: 2 | Status: SHIPPED | OUTPATIENT
Start: 2025-01-21

## 2025-01-21 RX ORDER — DOCUSATE SODIUM 100 MG/1
100 CAPSULE, LIQUID FILLED ORAL DAILY
Qty: 31 CAPSULE | Refills: 2 | Status: SHIPPED | OUTPATIENT
Start: 2025-01-21

## 2025-01-21 RX ORDER — LANOLIN ALCOHOL/MO/W.PET/CERES
CREAM (GRAM) TOPICAL
Qty: 62 TABLET | Refills: 2 | Status: SHIPPED | OUTPATIENT
Start: 2025-01-21

## 2025-01-21 RX ORDER — ERGOCALCIFEROL 1.25 MG/1
CAPSULE ORAL
Qty: 2 CAPSULE | Refills: 2 | Status: SHIPPED | OUTPATIENT
Start: 2025-01-21

## 2025-01-21 RX ORDER — ESCITALOPRAM OXALATE 20 MG/1
TABLET ORAL
Qty: 31 TABLET | Refills: 2 | Status: SHIPPED | OUTPATIENT
Start: 2025-01-21

## 2025-04-02 RX ORDER — GUAIFENESIN AND DEXTROMETHORPHAN HYDROBROMIDE 10; 100 MG/5ML; MG/5ML
SYRUP ORAL
Qty: 120 ML | Refills: 10 | OUTPATIENT
Start: 2025-04-02

## 2025-04-14 DIAGNOSIS — R05.9 COUGH, UNSPECIFIED TYPE: Primary | ICD-10-CM

## 2025-04-15 RX ORDER — GUAIFENESIN AND DEXTROMETHORPHAN HYDROBROMIDE 10; 100 MG/5ML; MG/5ML
SYRUP ORAL
Qty: 120 ML | Refills: 11 | Status: SHIPPED | OUTPATIENT
Start: 2025-04-15

## 2025-04-21 DIAGNOSIS — Z00.00 HEALTHCARE MAINTENANCE: Primary | ICD-10-CM

## 2025-04-23 RX ORDER — ACETAMINOPHEN 325 MG/1
TABLET ORAL
Qty: 14 TABLET | Refills: 1 | Status: SHIPPED | OUTPATIENT
Start: 2025-04-23

## 2025-05-13 DIAGNOSIS — F25.9 SCHIZOAFFECTIVE DISORDER, UNSPECIFIED TYPE: ICD-10-CM

## 2025-05-13 DIAGNOSIS — Z00.00 HEALTH CARE MAINTENANCE: ICD-10-CM

## 2025-05-15 RX ORDER — ERGOCALCIFEROL 1.25 MG/1
CAPSULE ORAL
Qty: 2 CAPSULE | Refills: 2 | Status: SHIPPED | OUTPATIENT
Start: 2025-05-15

## 2025-05-15 RX ORDER — RISPERIDONE 1 MG/1
TABLET ORAL
Qty: 56 TABLET | Refills: 2 | Status: SHIPPED | OUTPATIENT
Start: 2025-05-15

## 2025-05-15 RX ORDER — ESCITALOPRAM OXALATE 20 MG/1
TABLET ORAL
Qty: 28 TABLET | Refills: 2 | Status: SHIPPED | OUTPATIENT
Start: 2025-05-15

## 2025-05-15 RX ORDER — DOCUSATE SODIUM 100 MG/1
100 CAPSULE, LIQUID FILLED ORAL DAILY
Qty: 28 CAPSULE | Refills: 2 | Status: SHIPPED | OUTPATIENT
Start: 2025-05-15

## 2025-05-15 RX ORDER — LANOLIN ALCOHOL/MO/W.PET/CERES
CREAM (GRAM) TOPICAL
Qty: 56 TABLET | Refills: 2 | Status: SHIPPED | OUTPATIENT
Start: 2025-05-15

## 2025-06-16 ENCOUNTER — APPOINTMENT (OUTPATIENT)
Dept: PRIMARY CARE | Facility: CLINIC | Age: 45
End: 2025-06-16
Payer: MEDICAID

## 2025-06-16 VITALS
BODY MASS INDEX: 31.32 KG/M2 | HEART RATE: 73 BPM | SYSTOLIC BLOOD PRESSURE: 118 MMHG | WEIGHT: 188 LBS | DIASTOLIC BLOOD PRESSURE: 76 MMHG | OXYGEN SATURATION: 98 % | HEIGHT: 65 IN

## 2025-06-16 DIAGNOSIS — G40.909 SEIZURE DISORDER (MULTI): ICD-10-CM

## 2025-06-16 DIAGNOSIS — R92.333 HETEROGENEOUSLY DENSE TISSUE OF BOTH BREASTS ON MAMMOGRAPHY: ICD-10-CM

## 2025-06-16 DIAGNOSIS — F25.9 SCHIZOAFFECTIVE DISORDER, UNSPECIFIED TYPE: ICD-10-CM

## 2025-06-16 DIAGNOSIS — E55.9 VITAMIN D DEFICIENCY: ICD-10-CM

## 2025-06-16 DIAGNOSIS — Z12.31 ENCOUNTER FOR SCREENING MAMMOGRAM FOR MALIGNANT NEOPLASM OF BREAST: ICD-10-CM

## 2025-06-16 DIAGNOSIS — Z11.59 ENCOUNTER FOR SCREENING FOR OTHER VIRAL DISEASES: ICD-10-CM

## 2025-06-16 DIAGNOSIS — Z12.11 SCREEN FOR COLON CANCER: ICD-10-CM

## 2025-06-16 DIAGNOSIS — Z00.00 ANNUAL PHYSICAL EXAM: Primary | ICD-10-CM

## 2025-06-16 DIAGNOSIS — E66.811 OBESITY (BMI 30.0-34.9): ICD-10-CM

## 2025-06-16 DIAGNOSIS — E22.1 HYPERPROLACTINEMIA (MULTI): ICD-10-CM

## 2025-06-16 DIAGNOSIS — R63.4 WEIGHT LOSS: ICD-10-CM

## 2025-06-16 DIAGNOSIS — E78.5 HYPERLIPIDEMIA, UNSPECIFIED HYPERLIPIDEMIA TYPE: ICD-10-CM

## 2025-06-16 PROCEDURE — 99396 PREV VISIT EST AGE 40-64: CPT | Performed by: SPECIALIST

## 2025-06-16 PROCEDURE — 3008F BODY MASS INDEX DOCD: CPT | Performed by: SPECIALIST

## 2025-06-16 RX ORDER — ALUMINUM HYDROXIDE, MAGNESIUM HYDROXIDE, AND SIMETHICONE 1200; 120; 1200 MG/30ML; MG/30ML; MG/30ML
30 SUSPENSION ORAL EVERY 6 HOURS PRN
COMMUNITY

## 2025-06-16 ASSESSMENT — PATIENT HEALTH QUESTIONNAIRE - PHQ9
9. THOUGHTS THAT YOU WOULD BE BETTER OFF DEAD, OR OF HURTING YOURSELF: NOT AT ALL
7. TROUBLE CONCENTRATING ON THINGS, SUCH AS READING THE NEWSPAPER OR WATCHING TELEVISION: NOT AT ALL
3. TROUBLE FALLING OR STAYING ASLEEP OR SLEEPING TOO MUCH: SEVERAL DAYS
8. MOVING OR SPEAKING SO SLOWLY THAT OTHER PEOPLE COULD HAVE NOTICED. OR THE OPPOSITE, BEING SO FIGETY OR RESTLESS THAT YOU HAVE BEEN MOVING AROUND A LOT MORE THAN USUAL: NEARLY EVERY DAY
5. POOR APPETITE OR OVEREATING: MORE THAN HALF THE DAYS
SUM OF ALL RESPONSES TO PHQ QUESTIONS 1-9: 13
1. LITTLE INTEREST OR PLEASURE IN DOING THINGS: NEARLY EVERY DAY
4. FEELING TIRED OR HAVING LITTLE ENERGY: NEARLY EVERY DAY
SUM OF ALL RESPONSES TO PHQ9 QUESTIONS 1 AND 2: 4
6. FEELING BAD ABOUT YOURSELF - OR THAT YOU ARE A FAILURE OR HAVE LET YOURSELF OR YOUR FAMILY DOWN: NOT AT ALL
2. FEELING DOWN, DEPRESSED OR HOPELESS: SEVERAL DAYS

## 2025-06-16 ASSESSMENT — ENCOUNTER SYMPTOMS
OCCASIONAL FEELINGS OF UNSTEADINESS: 1
DEPRESSION: 1
LOSS OF SENSATION IN FEET: 0

## 2025-06-16 ASSESSMENT — PAIN SCALES - GENERAL: PAINLEVEL_OUTOF10: 0-NO PAIN

## 2025-06-16 NOTE — PATIENT INSTRUCTIONS
Recommend annual gynecology exams  Please schedule mammogram  Please schedule colonoscopy  Recommend annual influenza vaccine  Recommend Covid vaccine annually  Recommend Tdap

## 2025-06-16 NOTE — ASSESSMENT & PLAN NOTE
(6/20/2024)  Not on statin  Orders:    Comprehensive Metabolic Panel; Future    CBC; Future    Lipid Panel; Future

## 2025-06-16 NOTE — PROGRESS NOTES
Subjective   Patient ID: Cleo Lopez is a 44 y.o. female who presents for Annual Exam (physical).  HPI    44 yo female Pmhx sig for Seizure disorder, Allergic Rhinitis and Moderate MR presents for f/up accompanied by caregiver for CPE    Has a guardian    Allergies[1]   Current Outpatient Medications   Medication Instructions    acetaminophen (Tylenol) 325 mg tablet TAKE 1 TABLET BY MOUTH EVERY 6 HOURS AS NEEDED FOR PAIN OR FEVER ABOVE 99.5    alum-mag hydroxide-simeth (Mylanta) 200-200-20 mg/5 mL oral suspension 30 mL, Every 6 hours PRN    chlorhexidine (Peridex) 0.12 % solution Use in the mouth or throat 2 times a day. Swish and spit    dextromethorphan-guaifenesin  mg/5 mL syrup GIVE 10 ML BY MOUTH EVERY 6 HOURS AS NEEDED FOR COUGH OR CONGESTION    diazePAM (Valtoco) 20 mg/2 spray spray,non-aerosol nasal spray 1 spray, Once    docusate sodium (COLACE) 100 mg, oral, Daily, DIAGNOSIS: CONSTIPATION    ergocalciferol (Vitamin D-2) 1250 mcg (50,000 units) capsule TAKE 1 CAPSULE BY MOUTH EVERY OTHER WEEK ON 14TH AND 28TH *DIAGNOSIS: BONE HEALTH    escitalopram (Lexapro) 20 mg tablet TAKE 1 TABLET BY MOUTH EVERY DAY *DIAGNOSIS: DEPRESSION    ferrous sulfate (FeroSuL) 325 mg (65 mg elemental) tablet TAKE 1 TABLET BY MOUTH TWO TIMES WEEKLY ON WEDNESDAYS AND SATURDAYS *DIAGNOSIS: SUPPLEMENT    Oyster Shell Calcium-Vit D3 500 mg-5 mcg (200 unit) tablet TAKE 1 TABLET BY MOUTH TWICE DAILY *DIAGNOSIS: SUPPLEMENT    PHENobarbital (LUMINAL) 64.8 mg, 2 times daily    phenytoin ER (DILANTIN) 100 mg, Daily    risperiDONE (RisperDAL) 1 mg tablet TAKE 1 TABLET BY MOUTH TWICE DAILY *DIAGNOSIS: ANTIPSYCHOTIC    Tab-A-Shravan 400 mcg tablet TAKE 1 TABLET BY MOUTH EVERY EVENING (SUPPLEMENT)    zonisamide (ZONEGRAN) 100 mg, Daily        Review of Systems  Constitutional  No fatigue, no fevers, no chills, no unintentional weight loss,   HEENT:  No headaches, no dizziness, no double vision, no blurred vision, no hearing  "loss  Cardiovascular:  No chest pain, no palpitations, no shortness of breath with exertion (one flight of stairs,   Respiratory:  No cough, no hemoptysis, no wheezing, No shortness of breath at rest  GI:  No dysphagia, no odynophagia, no reflux, no abdominal pain, no nausea, no vomiting, no changes in bowel habits, no bright red blood per rectum, no melena  :  No urinary frequency, no dysuria, no urine incontinence  MSK:  No falls, no joint pain, no joint swelling  Neuro:  No tremors, no extremity weakness, no changes in sensation, no recent seizures    Physical Exam  /76 (BP Location: Right arm, Patient Position: Sitting, BP Cuff Size: Large adult)   Pulse 73   Ht 1.65 m (5' 4.96\")   Wt 85.3 kg (188 lb)   SpO2 98%   BMI 31.32 kg/m²   General:    Well-appearing  F in no acute distress, well nourished, well hydrated  Head:  Normocephalic, atraumatic  Skin:          Warm dry,   Eyes:  Anicteric sclera, pupils equal,   Ears:        TMs intact  Oral:      Not examined due to pandemic  Neck:   Supple, no cervical/supraclavicular adenopathy, no thyromegaly or nodules appreciated on exam  Cor:      Regular rate, normal S1, S2, no murmurs appreciated, no S3, no S4   Lungs:   Clear to auscultation b/l, no wheezes, no rhonchi, no crackles, no accessory respiratory muscle use  Abd:          Soft, nontender, no guarding, no rebound, no hepatosplenomegaly appreciated   Ext:            No lower extremity edema, no palpable cords  Pulses:      Pedal pulses intact  Neuro:   CN2-12 grossly intact (except funduscopic exam not performed and visual fields not examined)  Breasts:     Caregiver here as chaperone, No Axillary adenopathy, no discrete palpable breast nodules, no overlying skin changes, no nipple discharge      Assessment & Plan  Encounter for screening for other viral diseases  Ordered screen for hep C  Orders:    Hepatitis C Antibody; Future    Weight loss  Due for colon cancer screening  Will evaluate " thyroid and screen for iron deficiency  Orders:    Thyroid Stimulating Hormone; Future    Thyroxine, Free; Future    Iron and TIBC; Future    Screen for colon cancer  Had weight loss  Ordered colonoscopy   Has been taking iron 2 times per week on Weds and Saturdays)  (Used to take bid iron every day in 2011, no documentation as to why but ?perhaps menstrual blood loss anemia?)  Orders:    Colonoscopy Screening; Average Risk Patient; Future    Iron and TIBC; Future    Vitamin D deficiency  Currently taking Vitamin D 50,000 units once every other week  Will check level  Orders:    Vitamin D 25-Hydroxy,Total (for eval of Vitamin D levels); Future    Hyperlipidemia, unspecified hyperlipidemia type   (6/20/2024)  Not on statin  Orders:    Comprehensive Metabolic Panel; Future    CBC; Future    Lipid Panel; Future    Encounter for screening mammogram for malignant neoplasm of breast  Mammogram 12/17/2021 dense breast tissue  Orders:    BI mammo bilateral screening tomosynthesis; Future    Schizoaffective disorder, unspecified type  Stable on medication  Continue lexapro and risperdal       Hyperprolactinemia (Multi)  Prior mildly elevated Prolactin level 31.9 in 1/28/2022 and 22.4 on 1/11/2022 with gyne  Was to repeat level when fasting and after 24 hours without breast stimulation but did not do  Likely related to Risperdal which is known to cause increased prolactin levels  Will recheck  Orders:    PTH, intact; Future    Annual physical exam  Recommend annual influenza vaccine  Recommend Covid vaccine, last 2022  Prior Tdap ?   Mammo 12/2021 ordered  Last Pap 8/2020 negative   Recommend screening for colon cancer   BMI 31.32, continue weight loss efforts  Recommended annual gynecology exam (also due for pap)       Obesity (BMI 30.0-34.9)  Weight down from 197# in 12/2023 to 188# today  Will check thyroid medication  Is due for colon cancer screening       Seizure disorder (Multi)  Management per  neurology  Currently on Phenobarb, Phenytoin ER and Zonegran       Heterogeneously dense tissue of both breasts on mammography  Discussed dense breast tissue may decrease the sensitivity of the mammogram  Discussed  offers self pay FAST MRI for women with dense breast tissue          Carmelita Stiles DO          [1] No Known Allergies

## 2025-06-16 NOTE — ASSESSMENT & PLAN NOTE
Prior mildly elevated Prolactin level 31.9 in 1/28/2022 and 22.4 on 1/11/2022 with gyne  Was to repeat level when fasting and after 24 hours without breast stimulation but did not do  Likely related to Risperdal which is known to cause increased prolactin levels  Will recheck  Orders:    PTH, intact; Future

## 2025-06-16 NOTE — ASSESSMENT & PLAN NOTE
Currently taking Vitamin D 50,000 units once every other week  Will check level  Orders:    Vitamin D 25-Hydroxy,Total (for eval of Vitamin D levels); Future

## 2025-06-17 DIAGNOSIS — Z00.00 HEALTH CARE MAINTENANCE: ICD-10-CM

## 2025-06-18 LAB
25(OH)D3+25(OH)D2 SERPL-MCNC: 66 NG/ML (ref 30–100)
ALBUMIN SERPL-MCNC: 4 G/DL (ref 3.6–5.1)
ALP SERPL-CCNC: 141 U/L (ref 31–125)
ALT SERPL-CCNC: 15 U/L (ref 6–29)
ANION GAP SERPL CALCULATED.4IONS-SCNC: 8 MMOL/L (CALC) (ref 7–17)
AST SERPL-CCNC: 16 U/L (ref 10–35)
BILIRUB SERPL-MCNC: 0.3 MG/DL (ref 0.2–1.2)
BUN SERPL-MCNC: 14 MG/DL (ref 7–25)
CALCIUM SERPL-MCNC: 9 MG/DL (ref 8.6–10.2)
CHLORIDE SERPL-SCNC: 108 MMOL/L (ref 98–110)
CHOLEST SERPL-MCNC: 191 MG/DL
CHOLEST/HDLC SERPL: 2.9 (CALC)
CO2 SERPL-SCNC: 24 MMOL/L (ref 20–32)
CREAT SERPL-MCNC: 0.64 MG/DL (ref 0.5–0.99)
EGFRCR SERPLBLD CKD-EPI 2021: 110 ML/MIN/1.73M2
ERYTHROCYTE [DISTWIDTH] IN BLOOD BY AUTOMATED COUNT: 12.3 % (ref 11–15)
GLUCOSE SERPL-MCNC: 85 MG/DL (ref 65–99)
HCT VFR BLD AUTO: 41.6 % (ref 35–45)
HCV AB SERPL QL IA: NORMAL
HDLC SERPL-MCNC: 65 MG/DL
HGB BLD-MCNC: 13.8 G/DL (ref 11.7–15.5)
IRON SATN MFR SERPL: 64 % (CALC) (ref 16–45)
IRON SERPL-MCNC: 131 MCG/DL (ref 40–190)
LDLC SERPL CALC-MCNC: 113 MG/DL (CALC)
MCH RBC QN AUTO: 31.4 PG (ref 27–33)
MCHC RBC AUTO-ENTMCNC: 33.2 G/DL (ref 32–36)
MCV RBC AUTO: 94.8 FL (ref 80–100)
NONHDLC SERPL-MCNC: 126 MG/DL (CALC)
PLATELET # BLD AUTO: 200 THOUSAND/UL (ref 140–400)
PMV BLD REES-ECKER: 10.1 FL (ref 7.5–12.5)
POTASSIUM SERPL-SCNC: 4.2 MMOL/L (ref 3.5–5.3)
PROT SERPL-MCNC: 7 G/DL (ref 6.1–8.1)
PTH-INTACT SERPL-MCNC: 41 PG/ML (ref 16–77)
RBC # BLD AUTO: 4.39 MILLION/UL (ref 3.8–5.1)
SODIUM SERPL-SCNC: 140 MMOL/L (ref 135–146)
T4 FREE SERPL-MCNC: 0.7 NG/DL (ref 0.8–1.8)
TIBC SERPL-MCNC: 206 MCG/DL (CALC) (ref 250–450)
TRIGL SERPL-MCNC: 50 MG/DL
TSH SERPL-ACNC: 0.63 MIU/L
WBC # BLD AUTO: 5.5 THOUSAND/UL (ref 3.8–10.8)

## 2025-06-18 RX ORDER — FERROUS SULFATE 325(65) MG
TABLET ORAL
Qty: 9 TABLET | Refills: 5 | Status: SHIPPED | OUTPATIENT
Start: 2025-06-18

## 2025-06-18 RX ORDER — MULTIVITAMIN WITH FOLIC ACID 400 MCG
TABLET ORAL
Qty: 31 TABLET | Refills: 5 | Status: SHIPPED | OUTPATIENT
Start: 2025-06-18

## 2025-06-26 DIAGNOSIS — Z86.39 HISTORY OF IRON DEFICIENCY: Primary | ICD-10-CM

## 2025-06-26 DIAGNOSIS — R79.89 ABNORMAL THYROID BLOOD TEST: ICD-10-CM

## 2025-06-26 DIAGNOSIS — R74.8 ELEVATED ALKALINE PHOSPHATASE LEVEL: ICD-10-CM

## 2025-06-26 PROBLEM — E66.811 OBESITY (BMI 30.0-34.9): Status: ACTIVE | Noted: 2025-06-26

## 2025-06-26 PROBLEM — R39.9 UTI SYMPTOMS: Status: RESOLVED | Noted: 2024-07-02 | Resolved: 2025-06-26

## 2025-06-26 PROBLEM — R63.4 WEIGHT LOSS: Status: ACTIVE | Noted: 2025-06-26

## 2025-06-26 NOTE — ASSESSMENT & PLAN NOTE
Due for colon cancer screening  Will evaluate thyroid and screen for iron deficiency  Orders:    Thyroid Stimulating Hormone; Future    Thyroxine, Free; Future    Iron and TIBC; Future

## 2025-06-26 NOTE — ASSESSMENT & PLAN NOTE
Weight down from 197# in 12/2023 to 188# today  Will check thyroid medication  Is due for colon cancer screening

## 2025-07-14 ENCOUNTER — APPOINTMENT (OUTPATIENT)
Dept: RADIOLOGY | Facility: CLINIC | Age: 45
End: 2025-07-14
Payer: MEDICAID

## 2025-07-22 ENCOUNTER — APPOINTMENT (OUTPATIENT)
Dept: RADIOLOGY | Facility: CLINIC | Age: 45
End: 2025-07-22
Payer: MEDICAID

## 2025-07-26 DIAGNOSIS — R79.89 ABNORMAL THYROID BLOOD TEST: ICD-10-CM

## 2025-08-26 ENCOUNTER — APPOINTMENT (OUTPATIENT)
Dept: PRIMARY CARE | Facility: CLINIC | Age: 45
End: 2025-08-26
Payer: MEDICAID

## 2025-08-26 PROBLEM — D22.60 MELANOCYTIC NEVI OF UNSPECIFIED UPPER LIMB, INCLUDING SHOULDER: Status: ACTIVE | Noted: 2019-10-21

## 2025-08-26 PROBLEM — R79.89 PROLACTIN INCREASED: Status: ACTIVE | Noted: 2025-08-26

## 2025-08-26 PROBLEM — R05.9 COUGH: Status: ACTIVE | Noted: 2025-08-26

## 2025-08-26 PROBLEM — M79.675 CHRONIC PAIN OF TOE OF LEFT FOOT: Status: ACTIVE | Noted: 2025-08-26

## 2025-08-26 PROBLEM — D22.5 MELANOCYTIC NEVI OF TRUNK: Status: ACTIVE | Noted: 2019-10-21

## 2025-08-26 PROBLEM — R23.8 SKIN IRRITATION: Status: ACTIVE | Noted: 2025-08-26

## 2025-08-26 PROBLEM — G89.29 CHRONIC PAIN OF TOE OF RIGHT FOOT: Status: ACTIVE | Noted: 2025-08-26

## 2025-08-26 PROBLEM — R59.9 LYMPH NODE ENLARGEMENT: Status: ACTIVE | Noted: 2025-08-26

## 2025-08-26 PROBLEM — M79.89 LEG SWELLING: Status: ACTIVE | Noted: 2025-08-26

## 2025-08-26 PROBLEM — B35.1 FUNGAL NAIL INFECTION: Status: ACTIVE | Noted: 2025-08-26

## 2025-08-26 PROBLEM — G89.29 CHRONIC PAIN OF TOE OF LEFT FOOT: Status: ACTIVE | Noted: 2025-08-26

## 2025-08-26 PROBLEM — D22.39 MELANOCYTIC NEVI OF OTHER PARTS OF FACE: Status: ACTIVE | Noted: 2019-10-21

## 2025-08-26 PROBLEM — D22.70 MELANOCYTIC NEVI OF UNSPECIFIED LOWER LIMB, INCLUDING HIP: Status: ACTIVE | Noted: 2019-10-21

## 2025-08-26 PROBLEM — B96.89 BACTERIAL VAGINOSIS: Status: ACTIVE | Noted: 2025-08-26

## 2025-08-26 PROBLEM — L82.1 OTHER SEBORRHEIC KERATOSIS: Status: ACTIVE | Noted: 2019-10-21

## 2025-08-26 PROBLEM — M79.674 CHRONIC PAIN OF TOE OF RIGHT FOOT: Status: ACTIVE | Noted: 2025-08-26

## 2025-08-26 PROBLEM — M25.539 WRIST PAIN: Status: ACTIVE | Noted: 2025-08-26

## 2025-08-26 PROBLEM — H10.45 CHRONIC ALLERGIC CONJUNCTIVITIS: Status: ACTIVE | Noted: 2019-01-17

## 2025-08-26 PROBLEM — J30.1 ALLERGIC RHINITIS DUE TO POLLEN: Status: ACTIVE | Noted: 2025-08-26

## 2025-08-26 PROBLEM — D69.2 OTHER NONTHROMBOCYTOPENIC PURPURA: Status: ACTIVE | Noted: 2019-10-21

## 2025-08-26 PROBLEM — H10.9 CONJUNCTIVITIS: Status: ACTIVE | Noted: 2019-01-17

## 2025-08-26 PROBLEM — M79.609 PAIN IN LIMB: Status: ACTIVE | Noted: 2019-01-17

## 2025-08-26 PROBLEM — N76.0 BACTERIAL VAGINOSIS: Status: ACTIVE | Noted: 2025-08-26

## 2025-08-26 PROBLEM — M25.50 JOINT PAIN: Status: ACTIVE | Noted: 2025-08-26

## 2025-08-26 ASSESSMENT — ANXIETY QUESTIONNAIRES
6. BECOMING EASILY ANNOYED OR IRRITABLE: NOT AT ALL
2. NOT BEING ABLE TO STOP OR CONTROL WORRYING: NOT AT ALL
GAD7 TOTAL SCORE: 0
1. FEELING NERVOUS, ANXIOUS, OR ON EDGE: NOT AT ALL
7. FEELING AFRAID AS IF SOMETHING AWFUL MIGHT HAPPEN: NOT AT ALL
5. BEING SO RESTLESS THAT IT IS HARD TO SIT STILL: NOT AT ALL
4. TROUBLE RELAXING: NOT AT ALL
3. WORRYING TOO MUCH ABOUT DIFFERENT THINGS: NOT AT ALL

## 2025-08-26 ASSESSMENT — PATIENT HEALTH QUESTIONNAIRE - PHQ9
SUM OF ALL RESPONSES TO PHQ9 QUESTIONS 1 AND 2: 0
1. LITTLE INTEREST OR PLEASURE IN DOING THINGS: NOT AT ALL
2. FEELING DOWN, DEPRESSED OR HOPELESS: NOT AT ALL

## 2025-11-17 ENCOUNTER — APPOINTMENT (OUTPATIENT)
Facility: CLINIC | Age: 45
End: 2025-11-17
Payer: MEDICAID

## 2026-06-18 ENCOUNTER — APPOINTMENT (OUTPATIENT)
Dept: PRIMARY CARE | Facility: CLINIC | Age: 46
End: 2026-06-18
Payer: MEDICAID